# Patient Record
Sex: FEMALE | Race: WHITE | NOT HISPANIC OR LATINO
[De-identification: names, ages, dates, MRNs, and addresses within clinical notes are randomized per-mention and may not be internally consistent; named-entity substitution may affect disease eponyms.]

---

## 2017-04-10 ENCOUNTER — FORM ENCOUNTER (OUTPATIENT)
Age: 47
End: 2017-04-10

## 2017-05-14 ENCOUNTER — FORM ENCOUNTER (OUTPATIENT)
Age: 47
End: 2017-05-14

## 2017-05-22 ENCOUNTER — FORM ENCOUNTER (OUTPATIENT)
Age: 47
End: 2017-05-22

## 2017-09-08 ENCOUNTER — TRANSCRIPTION ENCOUNTER (OUTPATIENT)
Age: 47
End: 2017-09-08

## 2018-02-14 ENCOUNTER — FORM ENCOUNTER (OUTPATIENT)
Age: 48
End: 2018-02-14

## 2018-05-07 ENCOUNTER — FORM ENCOUNTER (OUTPATIENT)
Age: 48
End: 2018-05-07

## 2018-10-06 ENCOUNTER — TRANSCRIPTION ENCOUNTER (OUTPATIENT)
Age: 48
End: 2018-10-06

## 2021-02-26 ENCOUNTER — APPOINTMENT (OUTPATIENT)
Dept: HEART AND VASCULAR | Facility: CLINIC | Age: 51
End: 2021-02-26

## 2021-03-02 ENCOUNTER — APPOINTMENT (OUTPATIENT)
Dept: BREAST CENTER | Facility: CLINIC | Age: 51
End: 2021-03-02
Payer: COMMERCIAL

## 2021-03-02 VITALS
WEIGHT: 141 LBS | BODY MASS INDEX: 23.49 KG/M2 | SYSTOLIC BLOOD PRESSURE: 105 MMHG | HEIGHT: 65 IN | DIASTOLIC BLOOD PRESSURE: 71 MMHG | HEART RATE: 75 BPM

## 2021-03-02 DIAGNOSIS — Z00.00 ENCOUNTER FOR GENERAL ADULT MEDICAL EXAMINATION W/OUT ABNORMAL FINDINGS: ICD-10-CM

## 2021-03-02 DIAGNOSIS — K21.9 GASTRO-ESOPHAGEAL REFLUX DISEASE W/OUT ESOPHAGITIS: ICD-10-CM

## 2021-03-02 PROCEDURE — 99072 ADDL SUPL MATRL&STAF TM PHE: CPT

## 2021-03-02 PROCEDURE — 99213 OFFICE O/P EST LOW 20 MIN: CPT

## 2021-03-02 RX ORDER — LEVOTHYROXINE SODIUM 137 UG/1
TABLET ORAL
Refills: 0 | Status: ACTIVE | COMMUNITY

## 2021-04-09 ENCOUNTER — APPOINTMENT (OUTPATIENT)
Dept: HEART AND VASCULAR | Facility: CLINIC | Age: 51
End: 2021-04-09
Payer: COMMERCIAL

## 2021-04-09 ENCOUNTER — NON-APPOINTMENT (OUTPATIENT)
Age: 51
End: 2021-04-09

## 2021-04-09 VITALS
RESPIRATION RATE: 16 BRPM | OXYGEN SATURATION: 97 % | DIASTOLIC BLOOD PRESSURE: 60 MMHG | SYSTOLIC BLOOD PRESSURE: 100 MMHG | BODY MASS INDEX: 22.49 KG/M2 | HEIGHT: 65 IN | TEMPERATURE: 98 F | WEIGHT: 135 LBS | HEART RATE: 74 BPM

## 2021-04-09 DIAGNOSIS — E78.00 PURE HYPERCHOLESTEROLEMIA, UNSPECIFIED: ICD-10-CM

## 2021-04-09 PROCEDURE — 93000 ELECTROCARDIOGRAM COMPLETE: CPT

## 2021-04-09 PROCEDURE — 99203 OFFICE O/P NEW LOW 30 MIN: CPT

## 2021-04-09 PROCEDURE — 99072 ADDL SUPL MATRL&STAF TM PHE: CPT

## 2021-04-09 RX ORDER — PANTOPRAZOLE SODIUM 40 MG/1
40 TABLET, DELAYED RELEASE ORAL
Refills: 0 | Status: DISCONTINUED | COMMUNITY
End: 2021-04-09

## 2021-04-09 NOTE — PHYSICAL EXAM
[Normal Appearance] : normal appearance [General Appearance - Well Developed] : well developed [Well Groomed] : well groomed [General Appearance - Well Nourished] : well nourished [No Deformities] : no deformities [General Appearance - In No Acute Distress] : no acute distress [Normal Conjunctiva] : the conjunctiva exhibited no abnormalities [Eyelids - No Xanthelasma] : the eyelids demonstrated no xanthelasmas [FreeTextEntry1] : No JVD or bruits  [Heart Sounds] : normal S1 and S2 [Heart Rate And Rhythm] : heart rate and rhythm were normal [Murmurs] : no murmurs present [Respiration, Rhythm And Depth] : normal respiratory rhythm and effort [Exaggerated Use Of Accessory Muscles For Inspiration] : no accessory muscle use [Auscultation Breath Sounds / Voice Sounds] : lungs were clear to auscultation bilaterally [Abdomen Soft] : soft [Abdomen Tenderness] : non-tender [Abdomen Mass (___ Cm)] : no abdominal mass palpated [Abnormal Walk] : normal gait [Nail Clubbing] : no clubbing of the fingernails [Gait - Sufficient For Exercise Testing] : the gait was sufficient for exercise testing [Cyanosis, Localized] : no localized cyanosis [Petechial Hemorrhages (___cm)] : no petechial hemorrhages [Skin Color & Pigmentation] : normal skin color and pigmentation [] : no rash [No Venous Stasis] : no venous stasis [Skin Lesions] : no skin lesions [No Skin Ulcers] : no skin ulcer [No Xanthoma] : no  xanthoma was observed [Oriented To Time, Place, And Person] : oriented to person, place, and time [Mood] : the mood was normal [Affect] : the affect was normal [No Anxiety] : not feeling anxious

## 2021-04-09 NOTE — ASSESSMENT
[FreeTextEntry1] : HLD -  to 250, - 310.  Will get a CCS.  10 yr risk is low.If CCS elevated will try 1 other statin. TFTs NL.  Diet excellent, can exercise more.

## 2021-04-09 NOTE — REASON FOR VISIT
[FreeTextEntry1] : 50 my/o F with HLD, previously on LIPITOR, SEVERE PURITIS AND ANKLE WEAKNESS. 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  TRIED AN  OLDER ONE, ?PRAVACHOL, GOT PURITIS AND ITCH.   + Fam Hx.  GM  CAD 64, uncle  61 CVA.  Never smoked, diet excellent, exercises mildly. BP  runs low, no DM\par \par EKG: NSR, normal axis and intervals, no ST-Tw abnormalities. 21

## 2021-04-13 ENCOUNTER — APPOINTMENT (OUTPATIENT)
Dept: CT IMAGING | Facility: HOSPITAL | Age: 51
End: 2021-04-13

## 2021-04-13 ENCOUNTER — OUTPATIENT (OUTPATIENT)
Dept: OUTPATIENT SERVICES | Facility: HOSPITAL | Age: 51
LOS: 1 days | End: 2021-04-13
Payer: SELF-PAY

## 2021-04-13 PROCEDURE — 75571 CT HRT W/O DYE W/CA TEST: CPT | Mod: 26

## 2021-04-13 PROCEDURE — 75571 CT HRT W/O DYE W/CA TEST: CPT

## 2021-09-02 PROBLEM — N64.9 DISORDER OF BREAST, UNSPECIFIED: Status: ACTIVE | Noted: 2021-03-01

## 2021-09-03 PROBLEM — Z80.3 FAMILY HISTORY OF BREAST CANCER: Status: ACTIVE | Noted: 2021-09-03

## 2021-09-09 ENCOUNTER — APPOINTMENT (OUTPATIENT)
Dept: BREAST CENTER | Facility: CLINIC | Age: 51
End: 2021-09-09

## 2021-09-09 DIAGNOSIS — Z80.3 FAMILY HISTORY OF MALIGNANT NEOPLASM OF BREAST: ICD-10-CM

## 2021-09-09 DIAGNOSIS — N64.9 DISORDER OF BREAST, UNSPECIFIED: ICD-10-CM

## 2022-11-03 ENCOUNTER — NON-APPOINTMENT (OUTPATIENT)
Age: 52
End: 2022-11-03

## 2022-12-05 ENCOUNTER — APPOINTMENT (OUTPATIENT)
Dept: BREAST CENTER | Facility: CLINIC | Age: 52
End: 2022-12-05

## 2022-12-05 DIAGNOSIS — R92.2 INCONCLUSIVE MAMMOGRAM: ICD-10-CM

## 2022-12-06 ENCOUNTER — APPOINTMENT (OUTPATIENT)
Dept: BREAST CENTER | Facility: CLINIC | Age: 52
End: 2022-12-06

## 2022-12-06 VITALS
BODY MASS INDEX: 19.99 KG/M2 | SYSTOLIC BLOOD PRESSURE: 107 MMHG | HEART RATE: 64 BPM | HEIGHT: 65 IN | DIASTOLIC BLOOD PRESSURE: 74 MMHG | WEIGHT: 120 LBS

## 2022-12-06 DIAGNOSIS — E78.00 PURE HYPERCHOLESTEROLEMIA, UNSPECIFIED: ICD-10-CM

## 2022-12-06 DIAGNOSIS — K21.9 GASTRO-ESOPHAGEAL REFLUX DISEASE W/OUT ESOPHAGITIS: ICD-10-CM

## 2022-12-06 PROCEDURE — 99213 OFFICE O/P EST LOW 20 MIN: CPT

## 2022-12-06 RX ORDER — EVOLOCUMAB 140 MG/ML
140 INJECTION, SOLUTION SUBCUTANEOUS
Refills: 0 | Status: ACTIVE | COMMUNITY

## 2022-12-06 RX ORDER — EVOLOCUMAB 140 MG/ML
140 INJECTION, SOLUTION SUBCUTANEOUS
Qty: 2 | Refills: 0 | Status: ACTIVE | COMMUNITY
Start: 2022-11-04

## 2022-12-06 RX ORDER — SUCRALFATE 1 G/1
1 TABLET ORAL
Qty: 120 | Refills: 0 | Status: ACTIVE | COMMUNITY
Start: 2022-11-08

## 2022-12-06 RX ORDER — PANTOPRAZOLE SODIUM 20 MG/1
TABLET, DELAYED RELEASE ORAL
Refills: 0 | Status: ACTIVE | COMMUNITY

## 2022-12-06 RX ORDER — LEVOTHYROXINE SODIUM 0.05 MG/1
50 TABLET ORAL
Qty: 104 | Refills: 0 | Status: ACTIVE | COMMUNITY
Start: 2022-05-06

## 2022-12-06 RX ORDER — CYCLOSPORINE 0.5 MG/ML
0.05 EMULSION OPHTHALMIC
Qty: 60 | Refills: 0 | Status: ACTIVE | COMMUNITY
Start: 2022-03-01

## 2022-12-06 NOTE — ASSESSMENT
[FreeTextEntry1] : 51yo female with hx of L papilloma excision with Dr. Grimaldo ~10y ago and no personal hx of breast ca but FHx breast ca mother 73. Patient presents for breast cancer screening and denies palpable masses, skin changes, or nipple discharge bilaterally. B/L sMMG/US 12/6/22 BIRADS-1. [facility]%. Physical examination WNL. Patient to have MRI and re-examination with [facility] in 6mos. If benign, patient to then have mammo/sono and follow-up with Dr. Gong 6 months later. Patient verbalizes understanding and is in agreement with the plan.\par

## 2022-12-06 NOTE — DATA REVIEWED
[FreeTextEntry1] : 5/15/17: MRI: heterogeneous fibroglandular tissue and fat w/ mild to moderate background parenchymal enhancement, L – 1.2cm area of clumped enhancement lOQ. BIRADS 4 – MR bx rec\par 5/15/17: Dave MG: heterogeneously dense, L – central focal asymmetry. BIRADS 0. \par 5/23/17: L DXMG & US: heterogeneously dense, suspected focal asymmetry effaces on spot compression, US – minimal RA duct ectasia, 0.7cm simple cyst 3:00 5FN, 0.5cm simple cyst 4:00 7FN, 0.3cm cluster of cysts 4-5:00 5FN, no definite correlate to clumped enhancement on prior MRI. BIRADS 4 – MR bx rec\par 5/8/18: MRI: heterogeneously dense fibroglandular tissue w/ mild background parenchymal enhancement, L – 0.9cm clumped enhancement LOQ which is smaller and less apparent compared to prior study. BIRADS 2. \par \par 3/12/21 B/L sMMG/US ([facility]): Extremely dense. No mammographic or US evidence of malignancy. BIRADS 1 - Negative. Follow up with annual screening.\par \par 12/6/22 ([facility]) B/L sMMG/US: heterogeneously dense. No mammographic or US evidence of malignancy. FOLLOW-UP: annual screening. BIRADS-1: Negative\par

## 2022-12-06 NOTE — PAST MEDICAL HISTORY
[Menarche Age ____] : age at menarche was [unfilled] [Definite ___ (Date)] : the last menstrual period was [unfilled] [Total Preg ___] : G[unfilled] [Live Births ___] : P[unfilled]  [Age At Live Birth ___] : Age at live birth: [unfilled] [FreeTextEntry6] : no [FreeTextEntry7] : yes   [FreeTextEntry8] : yes

## 2022-12-06 NOTE — HISTORY OF PRESENT ILLNESS
[FreeTextEntry1] : 53 yo female presents for follow up with hx of L papilloma excision with Dr. Grimaldo ~10y ago and no personal hx of breast ca and FHx breast ca mother 73. Denies palpable masses, skin changes, or nipple discharge bilaterally. Patient previously seen by Dr. Grimaldo. B/L sMMG/US performed today BIRADS-1. \par \par GORGE Velasquezck Lifetime Risk Score 44.8%  \par

## 2022-12-06 NOTE — PHYSICAL EXAM
[Supple] : supple [No Supraclavicular Adenopathy] : no supraclavicular adenopathy [Examined in the supine and seated position] : examined in the supine and seated position [No dominant masses] : no dominant masses in right breast  [No dominant masses] : no dominant masses left breast [No Nipple Retraction] : no left nipple retraction [No Nipple Discharge] : no left nipple discharge [No Axillary Lymphadenopathy] : no left axillary lymphadenopathy [de-identified] : well-healed L lumpx scar

## 2023-03-06 ENCOUNTER — NON-APPOINTMENT (OUTPATIENT)
Age: 53
End: 2023-03-06

## 2023-06-01 ENCOUNTER — NON-APPOINTMENT (OUTPATIENT)
Age: 53
End: 2023-06-01

## 2023-06-02 ENCOUNTER — APPOINTMENT (OUTPATIENT)
Dept: BREAST CENTER | Facility: CLINIC | Age: 53
End: 2023-06-02
Payer: COMMERCIAL

## 2023-06-02 VITALS
SYSTOLIC BLOOD PRESSURE: 109 MMHG | HEIGHT: 65 IN | DIASTOLIC BLOOD PRESSURE: 75 MMHG | WEIGHT: 121 LBS | HEART RATE: 69 BPM | BODY MASS INDEX: 20.16 KG/M2

## 2023-06-02 DIAGNOSIS — Z86.018 PERSONAL HISTORY OF OTHER BENIGN NEOPLASM: ICD-10-CM

## 2023-06-02 PROCEDURE — 99213 OFFICE O/P EST LOW 20 MIN: CPT

## 2023-06-02 NOTE — PHYSICAL EXAM
[Supple] : supple [No Supraclavicular Adenopathy] : no supraclavicular adenopathy [Examined in the supine and seated position] : examined in the supine and seated position [No dominant masses] : no dominant masses in right breast  [No dominant masses] : no dominant masses left breast [No Nipple Retraction] : no left nipple retraction [No Nipple Discharge] : no left nipple discharge [No Axillary Lymphadenopathy] : no left axillary lymphadenopathy [de-identified] : bilateral vague nodularities throughout; dense breast tissue bilaterally  [de-identified] : well-healed L lumpx scar

## 2023-06-02 NOTE — DATA REVIEWED
[FreeTextEntry1] : 5/15/17: MRI: heterogeneous fibroglandular tissue and fat w/ mild to moderate background parenchymal enhancement, L – 1.2cm area of clumped enhancement lOQ. BIRADS 4 – MR bx rec\par 5/15/17: Dave MG: heterogeneously dense, L – central focal asymmetry. BIRADS 0. \par 5/23/17: L DXMG & US: heterogeneously dense, suspected focal asymmetry effaces on spot compression, US – minimal RA duct ectasia, 0.7cm simple cyst 3:00 5FN, 0.5cm simple cyst 4:00 7FN, 0.3cm cluster of cysts 4-5:00 5FN, no definite correlate to clumped enhancement on prior MRI. BIRADS 4 – MR bx rec\par 5/8/18: MRI: heterogeneously dense fibroglandular tissue w/ mild background parenchymal enhancement, L – 0.9cm clumped enhancement LOQ which is smaller and less apparent compared to prior study. BIRADS 2. \par \par 3/12/21 B/L sMMG/US ([facility]): Extremely dense. No mammographic or US evidence of malignancy. BIRADS 1 - Negative. Follow up with annual screening.\par \par 12/6/22 ([facility]) B/L sMMG/US: heterogeneously dense. No mammographic or US evidence of malignancy. FOLLOW-UP: annual screening. BIRADS-1: Negative\par \par 6/5/23 ([facility]) B/L MRI: PENDING

## 2023-06-02 NOTE — ASSESSMENT
[FreeTextEntry1] : 54yo female with hx of L papilloma excision with Dr. Grimaldo ~10y ago and no personal hx of breast ca but FHx breast ca mother 73.\par \par Patient without complaint. Physical exam reveals dense breast tissue with vague nodularities throughout bilateral breasts. Most recent imaging reviewed, B/L sMMG/US 12/6/22, BIRADS-1.  GORGE 44.8%. Patient with high risk B/L MRI scheduled 6/5/23. If benign, patient to then have mammo/sono and follow-up in 6 months. Patient verbalizes understanding and is in agreement with the plan.\par

## 2023-06-02 NOTE — HISTORY OF PRESENT ILLNESS
[FreeTextEntry1] : 54 yo female presents for follow up with hx of L papilloma excision with Dr. Grimaldo ~10y ago and no personal hx of breast ca and FHx breast ca mother 73. Denies palpable masses, skin changes, or nipple discharge bilaterally. Patient followed by Dr. Grimaldo. \par \par B/L sMMG/US performed 12/6/22 BIRADS-1. B/L MRI scheduled for 6/5/23. \par \par GOREG [facility]quistzick Lifetime Risk Score 44.8%  \par

## 2023-06-02 NOTE — PAST MEDICAL HISTORY
[Menarche Age ____] : age at menarche was [unfilled] [Definite ___ (Date)] : the last menstrual period was [unfilled] [Total Preg ___] : G[unfilled] [Live Births ___] : P[unfilled]  [Age At Live Birth ___] : Age at live birth: [unfilled] [Approximately ___] : the LMP was approximately [unfilled] [FreeTextEntry6] : no [FreeTextEntry7] : yes   [FreeTextEntry8] : yes

## 2023-06-05 ENCOUNTER — APPOINTMENT (OUTPATIENT)
Dept: BREAST CENTER | Facility: CLINIC | Age: 53
End: 2023-06-05

## 2023-06-06 ENCOUNTER — NON-APPOINTMENT (OUTPATIENT)
Age: 53
End: 2023-06-06

## 2023-12-19 ENCOUNTER — APPOINTMENT (OUTPATIENT)
Dept: HEMATOLOGY ONCOLOGY | Facility: CLINIC | Age: 53
End: 2023-12-19

## 2023-12-20 NOTE — DISCUSSION/SUMMARY
[FreeTextEntry1] : REASON FOR CONSULT: Page Roldan is a 53-year-old female self-referred for cancer genetic counseling and risk assessment due to a family history of cancer. Ms. Roldan was seen on 2023 at which time medical and family history was ascertained and a pedigree constructed. She was accompanied by her daughter.  RELEVANT MEDICAL HISTORY: Ms. Roldan is a healthy individual with no reported history of cancer. She has a family history of cancer, see below.  OTHER MEDICAL AND SURGICAL HISTORY: -	Medical History: HLD -	Surgical History: left breast excision for papilloma,   OB/GYN HISTORY: Obstetrical History:  Age at Menarche: 13 Menopausal Status: Premenopausal  Age at First Live Birth: 32 Oral Contraceptive Use: Yes, 10+ years Hormone Replacement Therapy: No  CANCER SCREENING HISTORY:   Breast:  -	Mammography: 22- wnl -	Sonography: 22- wnl -	MRI: 23- wnl -	Biopsies: yes, papilloma GYN: -	Pelvic Examination: Annual- reportedly wnl Colon: -	Colonoscopy: - possible polyp detected (doesn't remember), was told to repeat in 7 years Skin:   -	FBSE: yes -	Lesions biopsied/removed: no  SOCIAL HISTORY: -	Tobacco-product use: No -	Environmental exposures: No   FAMILY HISTORY: Maternal and paternal ancestry was reported as Eastern /Ashkenazi Adventist. A detailed family history of cancer was ascertained, see below and scanned chart for pedigree.   Of note, Ms. Roldan reported her daughter underwent genetic testing today with Dr. Gong as she is planning surgery for breast fibroadenoma excision.   According to Ms. Roldan no one else in the family has had germline testing for cancer susceptibility. Consanguinity was denied.  	 RISK ASSESSMENT: Ms. Roldan's personal and family history is suggestive of a hereditary cancer syndrome given her mother's history of breast cancer in the setting of Ashkenazi Adventist ancestry. The patient meets National Comprehensive Cancer Network (NCCN) criteria for genetic testing.   The risks, benefits and limitations of genetic testing were discussed with Ms. Roldan. In addition, we discussed the purpose of genetic testing and possible test results (positive, negative, inconclusive) along with associated medical management options and psychosocial implications. Insurance coverage and potential out of pocket costs were also discussed. The Genetic Information Non-discrimination Act (ARCHANA) was reviewed.  It was explained that risk assessment is based upon medical and family history as provided and may change in the future should new information be obtained.   Following our discussion, Ms. Roldan deferred genetic testing as she would like some time to think about implications. We remain available to coordinate this testing in the future if she would like to proceed.  PLAN:  1.	Patient deferred genetic testing today. We remain available to coordinate this testing in the future if she would like to proceed.   For any additional questions please call Cancer Genetics at [phone].    Katie Herzog MS, [facility] [place] [facility], [facility] Genetics

## 2024-01-09 ENCOUNTER — APPOINTMENT (OUTPATIENT)
Dept: BREAST CENTER | Facility: CLINIC | Age: 54
End: 2024-01-09

## 2024-02-09 PROBLEM — Z12.39 BREAST CANCER SCREENING OTHER THAN MAMMOGRAM: Status: ACTIVE | Noted: 2021-03-01

## 2024-02-09 PROBLEM — Z91.89 AT HIGH RISK FOR BREAST CANCER: Status: ACTIVE | Noted: 2021-09-02

## 2024-02-09 PROBLEM — Z80.3 FAMILY HISTORY OF BREAST CANCER: Status: ACTIVE | Noted: 2021-03-01

## 2024-02-09 NOTE — PHYSICAL EXAM
[Supple] : supple [No Supraclavicular Adenopathy] : no supraclavicular adenopathy [Examined in the supine and seated position] : examined in the supine and seated position [No dominant masses] : no dominant masses in right breast  [No dominant masses] : no dominant masses left breast [No Nipple Retraction] : no left nipple retraction [No Nipple Discharge] : no left nipple discharge [No Axillary Lymphadenopathy] : no left axillary lymphadenopathy [de-identified] : bilateral vague nodularities throughout; dense breast tissue bilaterally  [de-identified] : well-healed L lumpx scar

## 2024-02-09 NOTE — REVIEW OF SYSTEMS
[Shortness Of Breath] : no shortness of breath [As Noted in HPI] : as noted in HPI [Negative] : Constitutional

## 2024-02-09 NOTE — DATA REVIEWED
[FreeTextEntry1] : 5/15/17: MRI: heterogeneous fibroglandular tissue and fat w/ mild to moderate background parenchymal enhancement, L - 1.2cm area of clumped enhancement lOQ. BIRADS 4 - MR bx rec 5/15/17: Dave MG: heterogeneously dense, L - central focal asymmetry. BIRADS 0.  5/23/17: L DXMG & US: heterogeneously dense, suspected focal asymmetry effaces on spot compression, US - minimal RA duct ectasia, 0.7cm simple cyst 3:00 5FN, 0.5cm simple cyst 4:00 7FN, 0.3cm cluster of cysts 4-5:00 5FN, no definite correlate to clumped enhancement on prior MRI. BIRADS 4 - MR bx rec 5/8/18: MRI: heterogeneously dense fibroglandular tissue w/ mild background parenchymal enhancement, L - 0.9cm clumped enhancement LOQ which is smaller and less apparent compared to prior study. BIRADS 2.   3/12/21 B/L sMMG/US ([facility]): Extremely dense. No mammographic or US evidence of malignancy. BIRADS 1 - Negative. Follow up with annual screening.  12/6/22 ([facility]) B/L sMMG/US: heterogeneously dense. No mammographic or US evidence of malignancy. FOLLOW-UP: annual screening. BIRADS-1: Negative  6/5/23 ([facility]) B/L MRI: No MR evidence of malignancy. FOLLOW-UP: Annual screening. BI-RADS 1:  Negative.   2/13/24 ([facility]) B/L sMMG/US: PENDING

## 2024-02-09 NOTE — HISTORY OF PRESENT ILLNESS
[FreeTextEntry1] : 54 yo female presents for high risk follow up with hx of L papilloma excision with Dr. Grimaldo ~10y ago and no personal hx of breast ca and FHx breast ca mother 73. Denies palpable masses, skin changes, or nipple discharge bilaterally. Patient followed by Dr. Grimaldo.   B/L sMMG/US 2/13/24 BIRADS-????????????? B/L MRI 6/5/23 BIRADS-1.   Patient met with genetic counselor Katie Herzog 12/19/23, deferred genetic testing.  GORGE Logan Lifetime Risk Score 44.8%

## 2024-02-09 NOTE — PAST MEDICAL HISTORY
[Menarche Age ____] : age at menarche was [unfilled] [Definite ___ (Date)] : the last menstrual period was [unfilled] [Approximately ___] : the LMP was approximately [unfilled] [Total Preg ___] : G[unfilled] [Live Births ___] : P[unfilled]  [Age At Live Birth ___] : Age at live birth: [unfilled] [FreeTextEntry6] : no [FreeTextEntry7] : yes   [FreeTextEntry8] : yes

## 2024-02-09 NOTE — ASSESSMENT
[FreeTextEntry1] : 52 yo female with hx of L papilloma excision with Dr. Grimaldo ~10y ago and no personal hx of breast ca but FHx breast ca mother 73.  Patient without complaint. Physical exam reveals dense breast tissue with vague nodularities throughout bilateral breasts. Most recent imaging reviewed, B/L sMMG/US 2/13/24 BIRADS-?????   GORGE 44.8%. Plan for high risk B/L MRI and re-examination in June 2024. Patient verbalizes understanding and is in agreement with the plan.

## 2024-02-12 ENCOUNTER — NON-APPOINTMENT (OUTPATIENT)
Age: 54
End: 2024-02-12

## 2024-02-13 ENCOUNTER — APPOINTMENT (OUTPATIENT)
Dept: BREAST CENTER | Facility: CLINIC | Age: 54
End: 2024-02-13

## 2024-02-13 DIAGNOSIS — Z80.3 FAMILY HISTORY OF MALIGNANT NEOPLASM OF BREAST: ICD-10-CM

## 2024-02-13 DIAGNOSIS — Z91.89 OTHER SPECIFIED PERSONAL RISK FACTORS, NOT ELSEWHERE CLASSIFIED: ICD-10-CM

## 2024-02-13 DIAGNOSIS — Z12.39 ENCOUNTER FOR OTHER SCREENING FOR MALIGNANT NEOPLASM OF BREAST: ICD-10-CM
